# Patient Record
Sex: MALE | Race: WHITE | NOT HISPANIC OR LATINO | URBAN - METROPOLITAN AREA
[De-identification: names, ages, dates, MRNs, and addresses within clinical notes are randomized per-mention and may not be internally consistent; named-entity substitution may affect disease eponyms.]

---

## 2018-03-15 ENCOUNTER — APPOINTMENT (RX ONLY)
Dept: URBAN - METROPOLITAN AREA CLINIC 41 | Facility: CLINIC | Age: 48
Setting detail: DERMATOLOGY
End: 2018-03-15

## 2018-03-15 DIAGNOSIS — L40.0 PSORIASIS VULGARIS: ICD-10-CM | Status: UNCHANGED

## 2018-03-15 PROCEDURE — ? COUNSELING

## 2018-03-15 PROCEDURE — ? PRESCRIPTION

## 2018-03-15 PROCEDURE — 99213 OFFICE O/P EST LOW 20 MIN: CPT

## 2018-03-15 PROCEDURE — ? TREATMENT REGIMEN

## 2018-03-15 RX ORDER — HALOBETASOL PROPIONATE 0.5 MG/G
1 OINTMENT TOPICAL
Qty: 1 | Refills: 11 | Status: ERX

## 2018-03-15 NOTE — PROCEDURE: TREATMENT REGIMEN
Plan: Recommended seeing PCP for annual wellness visit. Discussed Otesla medication or Taltz medication daily x 6 months. Pt declines treatment today.
Detail Level: Zone

## 2019-01-30 ENCOUNTER — RX ONLY (OUTPATIENT)
Age: 49
Setting detail: RX ONLY
End: 2019-01-30

## 2019-01-30 RX ORDER — BETAMETHASONE DIPROPIONATE 0.5 MG/G
1 OINTMENT TOPICAL BID
Qty: 1 | Refills: 3 | Status: ERX

## 2019-04-01 ENCOUNTER — RX ONLY (OUTPATIENT)
Age: 49
Setting detail: RX ONLY
End: 2019-04-01

## 2019-04-01 RX ORDER — BETAMETHASONE DIPROPIONATE 0.5 MG/G
1 OINTMENT TOPICAL BID
Qty: 1 | Refills: 3 | Status: ERX

## 2020-03-03 ENCOUNTER — RX ONLY (OUTPATIENT)
Age: 50
Setting detail: RX ONLY
End: 2020-03-03

## 2020-03-03 RX ORDER — BETAMETHASONE DIPROPIONATE 0.5 MG/G
1 OINTMENT, AUGMENTED TOPICAL BID
Qty: 1 | Refills: 0 | Status: ERX

## 2020-04-01 ENCOUNTER — RX ONLY (OUTPATIENT)
Age: 50
Setting detail: RX ONLY
End: 2020-04-01

## 2020-04-01 RX ORDER — BETAMETHASONE DIPROPIONATE 0.5 MG/G
1 OINTMENT, AUGMENTED TOPICAL BID
Qty: 1 | Refills: 0 | Status: ERX

## 2020-04-30 ENCOUNTER — APPOINTMENT (RX ONLY)
Dept: URBAN - METROPOLITAN AREA CLINIC 2 | Facility: CLINIC | Age: 50
Setting detail: DERMATOLOGY
End: 2020-04-30

## 2020-04-30 DIAGNOSIS — L40.0 PSORIASIS VULGARIS: ICD-10-CM

## 2020-04-30 PROCEDURE — 99212 OFFICE O/P EST SF 10 MIN: CPT | Mod: 95

## 2020-04-30 PROCEDURE — ? COUNSELING

## 2020-04-30 PROCEDURE — ? PRESCRIPTION

## 2020-04-30 PROCEDURE — ? TREATMENT REGIMEN

## 2020-04-30 RX ORDER — HALOBETASOL PROPIONATE 0.5 MG/G
1 OINTMENT TOPICAL
Qty: 1 | Refills: 6 | Status: ERX

## 2020-04-30 NOTE — PROCEDURE: TREATMENT REGIMEN
Plan: Recommended seeing PCP for annual wellness visit. Given the extent of the disease discussed progression to systemic therapy.  The patient declines today.  Will continue with topical steroids. I discussed incorporation of elidel cream as a break from halobetasol as well as my concern for steroid atrophy given long term use. Pt declines alternative treatment today as feels halobetasol has worked the best over the years. Patient declined other therapy options at this time.
Detail Level: Zone

## 2022-03-17 ENCOUNTER — RX ONLY (OUTPATIENT)
Age: 52
Setting detail: RX ONLY
End: 2022-03-17

## 2022-03-17 RX ORDER — HALOBETASOL PROPIONATE 0.5 MG/G
1 OINTMENT TOPICAL BID
Qty: 50 | Refills: 0 | Status: ERX | COMMUNITY
Start: 2022-03-17

## 2022-05-24 ENCOUNTER — APPOINTMENT (RX ONLY)
Dept: URBAN - METROPOLITAN AREA CLINIC 2 | Facility: CLINIC | Age: 52
Setting detail: DERMATOLOGY
End: 2022-05-24

## 2022-05-24 DIAGNOSIS — L40.0 PSORIASIS VULGARIS: ICD-10-CM

## 2022-05-24 DIAGNOSIS — D485 NEOPLASM OF UNCERTAIN BEHAVIOR OF SKIN: ICD-10-CM

## 2022-05-24 PROBLEM — D48.5 NEOPLASM OF UNCERTAIN BEHAVIOR OF SKIN: Status: ACTIVE | Noted: 2022-05-24

## 2022-05-24 PROCEDURE — ? OTHER

## 2022-05-24 PROCEDURE — ? PRESCRIPTION

## 2022-05-24 PROCEDURE — ? TREATMENT REGIMEN

## 2022-05-24 PROCEDURE — 99213 OFFICE O/P EST LOW 20 MIN: CPT

## 2022-05-24 PROCEDURE — ? OBSERVATION

## 2022-05-24 PROCEDURE — ? COUNSELING

## 2022-05-24 RX ORDER — HALOBETASOL PROPIONATE 0.5 MG/G
1 OINTMENT TOPICAL BID
Qty: 50 | Refills: 12 | Status: ERX

## 2022-05-24 ASSESSMENT — BSA PSORIASIS: % BODY COVERED IN PSORIASIS: 60

## 2022-05-24 ASSESSMENT — LOCATION ZONE DERM: LOCATION ZONE: NECK

## 2022-05-24 ASSESSMENT — LOCATION DETAILED DESCRIPTION DERM: LOCATION DETAILED: LEFT CLAVICULAR NECK

## 2022-05-24 ASSESSMENT — LOCATION SIMPLE DESCRIPTION DERM: LOCATION SIMPLE: LEFT ANTERIOR NECK

## 2022-05-24 NOTE — PROCEDURE: OTHER
Note Text (......Xxx Chief Complaint.): This diagnosis correlates with the
Other (Free Text): Patient states the lesion developed one week ago and he has been picking at the area. He feels that it is resolving. I asked that he return for a biopsy if the lesion grows or is persistent.
Render Risk Assessment In Note?: no
Detail Level: Simple

## 2022-05-24 NOTE — PROCEDURE: TREATMENT REGIMEN
Plan: Recommended seeing PCP for annual wellness visit. Given the extent of the disease discussed progression to systemic therapy.  The patient declines today.  Will continue with topical steroids. I discussed incorporation of elidel cream as a break from halobetasol as well as my concern for steroid atrophy given long term use. Pt declines alternative treatment today as feels halobetasol has worked the best over the years. Patient declined other therapy options at this time.  Patient denies arthritis and is of the opinion that psoriasis does not cause arthritis.
Detail Level: Zone

## 2023-05-09 ENCOUNTER — APPOINTMENT (RX ONLY)
Dept: URBAN - METROPOLITAN AREA CLINIC 2 | Facility: CLINIC | Age: 53
Setting detail: DERMATOLOGY
End: 2023-05-09

## 2023-05-09 DIAGNOSIS — L40.0 PSORIASIS VULGARIS: ICD-10-CM

## 2023-05-09 PROCEDURE — ? PRESCRIPTION

## 2023-05-09 PROCEDURE — ? COUNSELING

## 2023-05-09 PROCEDURE — ? TREATMENT REGIMEN

## 2023-05-09 PROCEDURE — 99213 OFFICE O/P EST LOW 20 MIN: CPT

## 2023-05-09 RX ORDER — HALOBETASOL PROPIONATE 0.5 MG/G
1 OINTMENT TOPICAL BID
Qty: 50 | Refills: 11 | Status: ERX

## 2023-05-09 ASSESSMENT — BSA PSORIASIS: % BODY COVERED IN PSORIASIS: 50

## 2023-05-09 NOTE — PROCEDURE: TREATMENT REGIMEN
Plan: Recommended seeing PCP for annual wellness visit. Given the extent of the disease discussed progression to systemic therapy.  The patient declines today.  Will continue with topical steroids. I discussed incorporation of elidel cream as a break from halobetasol as well as my concern for steroid atrophy given long term use. Pt declines alternative treatment today as feels halobetasol has worked the best over the years. Patient declined other therapy options at this time.  Patient denies arthritis and is of the opinion that psoriasis does not cause arthritis.  Discussed exposure to natural light to decrease reliance on topical steroids through the summer.  The patient states he tends to flare after light exposure.
Detail Level: Zone

## 2024-06-12 RX ORDER — HALOBETASOL PROPIONATE 0.5 MG/G
1 OINTMENT TOPICAL BID
Qty: 50 | Refills: 0 | Status: ERX